# Patient Record
Sex: MALE | Race: BLACK OR AFRICAN AMERICAN | Employment: FULL TIME | URBAN - METROPOLITAN AREA
[De-identification: names, ages, dates, MRNs, and addresses within clinical notes are randomized per-mention and may not be internally consistent; named-entity substitution may affect disease eponyms.]

---

## 2018-09-18 ENCOUNTER — HOSPITAL ENCOUNTER (EMERGENCY)
Age: 68
Discharge: HOME | End: 2018-09-18
Attending: STUDENT IN AN ORGANIZED HEALTH CARE EDUCATION/TRAINING PROGRAM
Payer: MEDICARE

## 2018-09-18 VITALS
OXYGEN SATURATION: 99 % | HEIGHT: 72 IN | RESPIRATION RATE: 16 BRPM | SYSTOLIC BLOOD PRESSURE: 144 MMHG | DIASTOLIC BLOOD PRESSURE: 85 MMHG | WEIGHT: 186 LBS | BODY MASS INDEX: 25.19 KG/M2 | TEMPERATURE: 98 F | HEART RATE: 60 BPM

## 2018-09-18 DIAGNOSIS — I10 ASYMPTOMATIC HYPERTENSION: Primary | ICD-10-CM

## 2018-09-18 LAB
ATRIAL RATE: 60 BPM
CALCULATED P AXIS, ECG09: 77 DEGREES
CALCULATED R AXIS, ECG10: -2 DEGREES
CALCULATED T AXIS, ECG11: 33 DEGREES
DIAGNOSIS, 93000: NORMAL
P-R INTERVAL, ECG05: 154 MS
Q-T INTERVAL, ECG07: 416 MS
QRS DURATION, ECG06: 90 MS
QTC CALCULATION (BEZET), ECG08: 416 MS
VENTRICULAR RATE, ECG03: 60 BPM

## 2018-09-18 PROCEDURE — 93005 ELECTROCARDIOGRAM TRACING: CPT

## 2018-09-18 PROCEDURE — 99285 EMERGENCY DEPT VISIT HI MDM: CPT

## 2018-09-18 PROCEDURE — 99284 EMERGENCY DEPT VISIT MOD MDM: CPT

## 2018-09-18 RX ORDER — HYDROCHLOROTHIAZIDE 25 MG/1
12.5 TABLET ORAL DAILY
Qty: 7 TAB | Refills: 0 | Status: SHIPPED | OUTPATIENT
Start: 2018-09-18 | End: 2018-10-02

## 2018-09-18 NOTE — DISCHARGE INSTRUCTIONS
Learning About Diuretics for High Blood Pressure  Introduction  Diuretics help to lower blood pressure. This reduces your risk of a heart attack and stroke. It also reduces your risk of kidney disease. Diuretics cause your kidneys to remove sodium and water. They also relax the blood vessel walls. These help lower your blood pressure. Examples  · Chlorthalidone  · Hydrochlorothiazide  Possible side effects  There are some common side effects. They are:  · Too little potassium. · Feeling dizzy. · Rash. · Urinating a lot. · High blood sugar. (But this is not common.)  You may have other side effects. Check the information that comes with your medicine. What to know about taking this medicine  · You may take other medicines for blood pressure. Diuretics can help those work better. They can also prevent extra fluid in your body. · You may need to take potassium pills. Or you may have to watch how much potassium is in your food. Ask your doctor about this. · You may need blood tests to check your kidneys and your potassium level. · Take your medicines exactly as prescribed. Call your doctor if you think you are having a problem with your medicine. · Check with your doctor or pharmacist before you use any other medicines. This includes over-the-counter medicines. Make sure your doctor knows all of the medicines, vitamins, herbal products, and supplements you take. Taking some medicines together can cause problems. Where can you learn more? Go to http://jenny-kuldip.info/. Enter H609 in the search box to learn more about \"Learning About Diuretics for High Blood Pressure. \"  Current as of: May 10, 2017  Content Version: 11.7  © 8906-9856 [employer]. Care instructions adapted under license by [employer] (which disclaims liability or warranty for this information).  If you have questions about a medical condition or this instruction, always ask your healthcare professional. [address] any warranty or liability for your use of this information. High Blood Pressure: Care Instructions  Your Care Instructions    If your blood pressure is usually above 130/80, you have high blood pressure, or hypertension. That means the top number is 130 or higher or the bottom number is 80 or higher, or both. Despite what a lot of people think, high blood pressure usually doesn't cause headaches or make you feel dizzy or lightheaded. It usually has no symptoms. But it does increase your risk for heart attack, stroke, and kidney or eye damage. The higher your blood pressure, the more your risk increases. Your doctor will give you a goal for your blood pressure. Your goal will be based on your health and your age. Lifestyle changes, such as eating healthy and being active, are always important to help lower blood pressure. You might also take medicine to reach your blood pressure goal.  Follow-up care is a key part of your treatment and safety. Be sure to make and go to all appointments, and call your doctor if you are having problems. It's also a good idea to know your test results and keep a list of the medicines you take. How can you care for yourself at home? Medical treatment  · If you stop taking your medicine, your blood pressure will go back up. You may take one or more types of medicine to lower your blood pressure. Be safe with medicines. Take your medicine exactly as prescribed. Call your doctor if you think you are having a problem with your medicine. · Talk to your doctor before you start taking aspirin every day. Aspirin can help certain people lower their risk of a heart attack or stroke. But taking aspirin isn't right for everyone, because it can cause serious bleeding. · See your doctor regularly. You may need to see the doctor more often at first or until your blood pressure comes down.   · If you are taking blood pressure medicine, talk to your doctor before you take decongestants or anti-inflammatory medicine, such as ibuprofen. Some of these medicines can raise blood pressure. · Learn how to check your blood pressure at home. Lifestyle changes  · Stay at a healthy weight. This is especially important if you put on weight around the waist. Losing even 10 pounds can help you lower your blood pressure. · If your doctor recommends it, get more exercise. Walking is a good choice. Bit by bit, increase the amount you walk every day. Try for at least 30 minutes on most days of the week. You also may want to swim, bike, or do other activities. · Avoid or limit alcohol. Talk to your doctor about whether you can drink any alcohol. · Try to limit how much sodium you eat to less than 2,300 milligrams (mg) a day. Your doctor may ask you to try to eat less than 1,500 mg a day. · Eat plenty of fruits (such as bananas and oranges), vegetables, legumes, whole grains, and low-fat dairy products. · Lower the amount of saturated fat in your diet. Saturated fat is found in animal products such as milk, cheese, and meat. Limiting these foods may help you lose weight and also lower your risk for heart disease. · Do not smoke. Smoking increases your risk for heart attack and stroke. If you need help quitting, talk to your doctor about stop-smoking programs and medicines. These can increase your chances of quitting for good. When should you call for help? Call 911 anytime you think you may need emergency care. This may mean having symptoms that suggest that your blood pressure is causing a serious heart or blood vessel problem. Your blood pressure may be over 180/110.   For example, call 911 if:    · You have symptoms of a heart attack. These may include:  ¨ Chest pain or pressure, or a strange feeling in the chest.  ¨ Sweating. ¨ Shortness of breath. ¨ Nausea or vomiting.   ¨ Pain, pressure, or a strange feeling in the back, neck, jaw, or upper belly or in one or both shoulders or arms. ¨ Lightheadedness or sudden weakness. ¨ A fast or irregular heartbeat.     · You have symptoms of a stroke. These may include:  ¨ Sudden numbness, tingling, weakness, or loss of movement in your face, arm, or leg, especially on only one side of your body. ¨ Sudden vision changes. ¨ Sudden trouble speaking. ¨ Sudden confusion or trouble understanding simple statements. ¨ Sudden problems with walking or balance. ¨ A sudden, severe headache that is different from past headaches.     · You have severe back or belly pain.    Do not wait until your blood pressure comes down on its own. Get help right away.   Call your doctor now or seek immediate care if:    · Your blood pressure is much higher than normal (such as 180/110 or higher), but you don't have symptoms.     · You think high blood pressure is causing symptoms, such as:  ¨ Severe headache. ¨ Blurry vision.    Watch closely for changes in your health, and be sure to contact your doctor if:    · Your blood pressure measures 140/90 or higher at least 2 times. That means the top number is 140 or higher or the bottom number is 90 or higher, or both.     · You think you may be having side effects from your blood pressure medicine.     · Your blood pressure is usually normal, but it goes above normal at least 2 times. Where can you learn more? Go to http://jenny-kuldip.info/. Enter V107 in the search box to learn more about \"High Blood Pressure: Care Instructions. \"  Current as of: December 6, 2017  Content Version: 11.7  © 4200-6033 [employer]. Care instructions adapted under license by [employer] (which disclaims liability or warranty for this information). If you have questions about a medical condition or this instruction, always ask your healthcare professional. [address] any warranty or liability for your use of this information.

## 2018-09-18 NOTE — ED NOTES
Patient denies shortness of breath, chest pain, or pressure, or headache. A & O x 4. No requests. Awaiting disposition.

## 2018-09-18 NOTE — ED TRIAGE NOTES
Patient arrived from home c.o chest discomfort and hypertension. Patient denies hx hypertension or home medications. Patient has had surgical procedure to abdomin.

## 2018-09-18 NOTE — ED PROVIDER NOTES
EMERGENCY DEPARTMENT HISTORY AND PHYSICAL EXAM 
 
3:53 PM 
 
 
Date: 9/18/2018 Patient Name: Patrice Li History of Presenting Illness Chief Complaint Patient presents with  Hypertension History Provided By: Patient Chief Complaint: Hypertension Duration:  N/A Timing:  N/A Location: N/A Quality: No Pain Severity: N/A Modifying Factors: N/A Associated Symptoms: denies any other associated signs or symptoms Additional History (Context): Patrice Li is a 77 y.o. male with No significant past medical history who presents with asymptomatic hypertension. Patient reports he took routine BP at [facility] and noted it to be 180/94 with repeat readings at a similar level. He denies any associated chest pain, dyspnea, abdominal pain, nausea, arm pain, neck pain, or neurologic symptoms. He does endorse occasional central chest burning he attributes to acid reflux. PCP: None Current Outpatient Prescriptions Medication Sig Dispense Refill  hydroCHLOROthiazide (HYDRODIURIL) 25 mg tablet Take 0.5 Tabs by mouth daily for 14 days. 7 Tab 0  
 STOOL SOFTENER-LAXATIVE 8.6-50 mg per tablet   1  
 silver sulfADIAZINE (SILVADENE) 1 % topical cream Apply  to affected area daily. If blisters to the right side of abdominal incision burst. 50 g 0  
 oxyCODONE-acetaminophen (PERCOCET) 7.5-325 mg per tablet Take  by mouth every four (4) hours as needed for Pain.  oxyCODONE-acetaminophen (PERCOCET) 5-325 mg per tablet 1 or 2 tablets by mouth every 4 hours as needed 40 Tab 0 Past History Past Medical History: No past medical history on file. Past Surgical History: 
Past Surgical History:  
Procedure Laterality Date  HX HERNIA REPAIR Family History: No family history on file. Social History: 
Social History Substance Use Topics  Smoking status: Never Smoker  Smokeless tobacco: Never Used  Alcohol use Not on file Allergies: 
No Known Allergies Review of Systems Review of Systems Constitutional: Negative. Negative for diaphoresis and fatigue. Eyes: Negative for photophobia and visual disturbance. Respiratory: Negative for cough, chest tightness and shortness of breath. Cardiovascular: Negative for chest pain, palpitations and leg swelling. Gastrointestinal: Negative for abdominal pain, nausea and vomiting. Endocrine: Negative for polydipsia and polyuria. Musculoskeletal: Negative. Skin: Negative. Neurological: Negative. Physical Exam  
 
Visit Vitals  /85  Pulse 60  Temp 98 °F (36.7 °C)  Resp 16  
 Ht 6' (1.829 m)  Wt 84.4 kg (186 lb)  SpO2 99%  BMI 25.23 kg/m2 Physical Exam  
Constitutional: He is oriented to person, place, and time. He appears well-developed and well-nourished. HENT:  
Head: Normocephalic and atraumatic. Neck: Normal range of motion. Neck supple. No JVD present. Cardiovascular: Normal rate, regular rhythm, S1 normal, S2 normal, normal heart sounds and intact distal pulses. No murmur heard. Pulses: 
     Radial pulses are 2+ on the right side, and 2+ on the left side. Pulmonary/Chest: Effort normal and breath sounds normal. No respiratory distress. He exhibits no tenderness. Abdominal: Soft. He exhibits no distension. There is no tenderness. Musculoskeletal: He exhibits no edema. Neurological: He is alert and oriented to person, place, and time. Coordination normal.  
Skin: Skin is warm and dry. Psychiatric: He has a normal mood and affect. Diagnostic Study Results Labs - Recent Results (from the past 12 hour(s)) EKG, 12 LEAD, INITIAL Collection Time: 09/18/18  3:42 PM  
Result Value Ref Range Ventricular Rate 60 BPM  
 Atrial Rate 60 BPM  
 P-R Interval 154 ms QRS Duration 90 ms Q-T Interval 416 ms  
 QTC Calculation (Bezet) 416 ms Calculated P Axis 77 degrees Calculated R Axis -2 degrees Calculated T Axis 33 degrees Diagnosis Normal sinus rhythm Voltage criteria for left ventricular hypertrophy ST elevation, probably due to early repolarization Abnormal ECG When compared with ECG of 01-JUL-2015 00:21, 
premature supraventricular complexes are no longer present Vent. rate has decreased BY  61 BPM 
ST no longer depressed in Inferior leads ST elevation now present in Anterolateral leads Nonspecific T wave abnormality no longer evident in Lateral leads Radiologic Studies - No orders to display Medical Decision Making I am the first provider for this patient. I reviewed the vital signs, available nursing notes, past medical history, past surgical history, family history and social history. Vital Signs-Reviewed the patient's vital signs. Pulse Oximetry Analysis -  100 on room air (Interpretation) Cardiac Monitor: 
Rate:  
Rhythm:  Normal Sinus Rhythm EKG: Interpreted by the EP. Time Interpreted:  
 Rate:  
 Rhythm: Normal Sinus Rhythm Interpretation: 
 Comparison:  
 
Records Reviewed: Nursing Notes (Time of Review: 3:53 PM) ED Course: Progress Notes, Reevaluation, and Consults: 
 
 
Provider Notes (Medical Decision Making):  
77 YOM with no known medical history presenting with asymptomatic hypertension. No associated sxs at this time. Cardiopulmonary exam unremarkable, no focal neurologic deficits, patient alert and oriented x4. Without chest pain, dyspnea, fatigue, or other evidence of end organ damage, acute cardiovascular emergency is highly unlikely at this time. Patient's past encounters have demonstrated hypertension, his HTN is likely long-standing. EKG demonstrated no evidence of STEMI or arrythmia. Patient does not require further emergent diagnostics or intervention at this time.  Will discharge patient home with 2 weeks of HCTZ and referral for family medicine clinic follow-up for primary care and treatment of HTN. Patient counseled on long-term effects of untreated HTN and return precautions. Patient stable at this time and safe for discharge. Procedures:  
 
Core Measures:  
 
Critical Care Time:  
 
For Hospitalized Patients: 
 
1. Hospitalization Decision Time: The decision to hospitalize the patient was made by Dr. murcia  on 9/18/2018 2. Aspirin: Aspirin was not given because the patient did not present with a stroke at the time of their Emergency Department evaluation Diagnosis Clinical Impression: 1. Asymptomatic hypertension Disposition: Discharged Follow-up Information Follow up With Details Comments Contact Info Branden Platt MD Call in 1 week For primary care and follow-up [address] [facility] [address] 
[phone] Discharge Medication List as of 9/18/2018  4:11 PM  
  
START taking these medications Details  
hydroCHLOROthiazide (HYDRODIURIL) 25 mg tablet Take 0.5 Tabs by mouth daily for 14 days. , Normal, Disp-7 Tab, R-0  
  
  
CONTINUE these medications which have NOT CHANGED Details STOOL SOFTENER-LAXATIVE 8.6-50 mg per tablet Historical Med, R-1  
  
silver sulfADIAZINE (SILVADENE) 1 % topical cream Apply  to affected area daily. If blisters to the right side of abdominal incision burst., Normal, Disp-50 g, R-0  
  
oxyCODONE-acetaminophen (PERCOCET) 7.5-325 mg per tablet Take  by mouth every four (4) hours as needed for Pain., Historical Med  
  
oxyCODONE-acetaminophen (PERCOCET) 5-325 mg per tablet 1 or 2 tablets by mouth every 4 hours as needed, Print, Disp-40 Tab, R-0  
  
  
 
_______________________________ Attestations: 
Scribe Attestation Ling Anderson MD acting as a scribe for and in the presence of No att. providers found September 18, 2018 at 4:29 PM 
    
Provider Attestation:     
I personally performed the services described in the documentation, reviewed the documentation, as recorded by the scribe in my presence, and it accurately and completely records my words and actions. September 18, 2018 at 4:29 PM - No att. providers found   
_______________________________

## 2020-05-17 ENCOUNTER — HOSPITAL ENCOUNTER (EMERGENCY)
Age: 70
Discharge: HOME | End: 2020-05-17
Attending: EMERGENCY MEDICINE
Payer: MEDICARE

## 2020-05-17 VITALS
SYSTOLIC BLOOD PRESSURE: 186 MMHG | HEART RATE: 87 BPM | DIASTOLIC BLOOD PRESSURE: 88 MMHG | TEMPERATURE: 98.3 F | RESPIRATION RATE: 20 BRPM | OXYGEN SATURATION: 97 %

## 2020-05-17 DIAGNOSIS — K08.89 PAIN, DENTAL: Primary | ICD-10-CM

## 2020-05-17 PROCEDURE — 99282 EMERGENCY DEPT VISIT SF MDM: CPT

## 2020-05-17 RX ORDER — IBUPROFEN 400 MG/1
400 TABLET ORAL
Qty: 20 TAB | Refills: 0 | Status: SHIPPED | OUTPATIENT
Start: 2020-05-17

## 2020-05-17 RX ORDER — AMOXICILLIN 500 MG/1
500 TABLET, FILM COATED ORAL 3 TIMES DAILY
Qty: 30 TAB | Refills: 0 | Status: SHIPPED | OUTPATIENT
Start: 2020-05-17 | End: 2020-05-27

## 2020-05-17 RX ORDER — HYDROCODONE BITARTRATE AND ACETAMINOPHEN 5; 325 MG/1; MG/1
1 TABLET ORAL
Qty: 9 TAB | Refills: 0 | Status: SHIPPED | OUTPATIENT
Start: 2020-05-17 | End: 2020-05-20

## 2020-05-17 NOTE — ED PROVIDER NOTES
EMERGENCY DEPARTMENT HISTORY AND PHYSICAL EXAM    3:37 PM      Date: 5/17/2020  Patient Name: Ty Douglass    History of Presenting Illness     Chief Complaint   Patient presents with    Dental Pain     L bottom dental pain       History Provided By: Patient    Chief Complaint: left lower dental pain  Duration:  Days  Timing:  Acute  Location:   Quality: Aching  Severity: 9 out of 10  Modifying Factors: not improved with Tylenol  Associated Symptoms: denies any other associated signs or symptoms      Additional History (Context):Murali Jackson is a 65 y.o. male who presents to the emergency department for evaluation of left lower dental pain for the past few days. States he is unable to get in with his dentist until after 5/21/20. No improvement with Tylenol at home. He denies any recent fevers, chills, vomiting, diarrhea, difficulty breathing, difficulty swallowing, URI symptoms, CP, cough, or any other concerns. PCP:  None      Current Outpatient Medications   Medication Sig Dispense Refill    amoxicillin 500 mg tab Take 500 mg by mouth three (3) times daily for 10 days. 30 Tab 0    HYDROcodone-acetaminophen (Norco) 5-325 mg per tablet Take 1 Tab by mouth every eight (8) hours as needed for Pain for up to 3 days. Max Daily Amount: 3 Tabs. 9 Tab 0    ibuprofen (MOTRIN) 400 mg tablet Take 1 Tab by mouth every eight (8) hours as needed for Pain. 20 Tab 0    STOOL SOFTENER-LAXATIVE 8.6-50 mg per tablet   1    silver sulfADIAZINE (SILVADENE) 1 % topical cream Apply  to affected area daily. If blisters to the right side of abdominal incision burst. 50 g 0    oxyCODONE-acetaminophen (PERCOCET) 7.5-325 mg per tablet Take  by mouth every four (4) hours as needed for Pain.  oxyCODONE-acetaminophen (PERCOCET) 5-325 mg per tablet 1 or 2 tablets by mouth every 4 hours as needed 40 Tab 0       Past History     Past Medical History:  No past medical history on file.     Past Surgical History:  Past Surgical History:   Procedure Laterality Date    HX HERNIA REPAIR         Family History:  No family history on file. Social History:  Social History     Tobacco Use    Smoking status: Never Smoker    Smokeless tobacco: Never Used   Substance Use Topics    Alcohol use: Not on file    Drug use: Not on file       Allergies:  No Known Allergies      Review of Systems       Review of Systems   Constitutional: Negative for chills and fever. HENT: Positive for dental problem. Negative for congestion, rhinorrhea and sore throat. Respiratory: Negative for cough and shortness of breath. Cardiovascular: Negative for chest pain. Gastrointestinal: Negative for abdominal pain, blood in stool, constipation, diarrhea, nausea and vomiting. Genitourinary: Negative for dysuria, frequency and hematuria. Musculoskeletal: Negative for back pain and myalgias. Skin: Negative for rash and wound. Neurological: Negative for dizziness and headaches. All other systems reviewed and are negative. Physical Exam     Visit Vitals  /88 (BP 1 Location: Left arm, BP Patient Position: At rest)   Pulse 87   Temp 98.3 °F (36.8 °C)   Resp 20   SpO2 97%       Physical Exam  Vitals signs and nursing note reviewed. Constitutional:       General: He is not in acute distress. Appearance: He is well-developed. He is not diaphoretic. HENT:      Head: Normocephalic and atraumatic. Mouth/Throat:      Comments: Diffusely poor dentition. No localized induration or fluctuance to suggest drainable abscess. No sublingual/submandibular induration, trismus, or stridor. Normal speech. Handling oral secretions without difficulty. Pt with full ROM of neck. Eyes:      Conjunctiva/sclera: Conjunctivae normal.   Neck:      Musculoskeletal: Normal range of motion and neck supple. Cardiovascular:      Rate and Rhythm: Normal rate and regular rhythm. Heart sounds: Normal heart sounds.    Pulmonary:      Effort: Pulmonary effort is normal. No respiratory distress. Breath sounds: Normal breath sounds. Chest:      Chest wall: No tenderness. Musculoskeletal: Normal range of motion. General: No deformity. Skin:     General: Skin is warm and dry. Neurological:      Mental Status: He is alert and oriented to person, place, and time. Diagnostic Study Results     Labs -  No results found for this or any previous visit (from the past 12 hour(s)). Radiologic Studies -   No results found. Medical Decision Making   I am the first provider for this patient. I reviewed the vital signs, available nursing notes, past medical history, past surgical history, family history and social history. Vital Signs-Reviewed the patient's vital signs. Pulse Oximetry Analysis -  97% on room air (Interpretation)    Records Reviewed: Nursing Notes and Old Medical Records (Time of Review: 3:37 PM)    ED Course: Progress Notes, Reevaluation, and Consults:    Provider Notes (Medical Decision Making):   Differential Diagnosis:  Dentalgia, dental caries, dental fracture, periodontal abscess/cellulitis, gingivitis, facial abscess/cellulitis    Plan:  Pt presents ambulatory in NAD, well-hydrated, non-toxic in appearance, with normal vitals. Exam reveals diffusely poor dentition without localized induration or fluctuance to suggest drainable abscess. No sublingual/submandibular induration, trismus, or stridor. Normal speech. Handling oral secretions without difficulty. Pt with full ROM of neck. Low suspicion for Isai's angina or deep space infection. Will DC home with pain meds, amoxicillin. Pt is strongly advised to follow-up with dentist in short period of time as they will need definitive management. At this time, patient is stable and appropriate for discharge home. Patient demonstrates understanding of current diagnoses and is in agreement with the treatment plan.   They are advised that while the likelihood of serious underlying condition is low at this point given the evaluation performed today, we cannot fully rule it out. They are advised to immediately return with any new symptoms or worsening of current condition. All questions have been answered. Patient is given educational material regarding their diagnoses, including danger symptoms and when to return to the ED. Diagnosis     Clinical Impression:   1. Pain, dental        Disposition: DC Home    Follow-up Information     Follow up With Specialties Details Why Contact Info    Your dentist  Call For follow-up     [facility] EMERGENCY DEPT Emergency Medicine Go to As needed, If symptoms worsen [address] [place] [address] [address] [address]  [phone]           Discharge Medication List as of 5/17/2020  3:36 PM      CONTINUE these medications which have NOT CHANGED    Details   STOOL SOFTENER-LAXATIVE 8.6-50 mg per tablet Historical Med, R-1      silver sulfADIAZINE (SILVADENE) 1 % topical cream Apply  to affected area daily. If blisters to the right side of abdominal incision burst., Normal, Disp-50 g, R-0      oxyCODONE-acetaminophen (PERCOCET) 7.5-325 mg per tablet Take  by mouth every four (4) hours as needed for Pain., Historical Med      oxyCODONE-acetaminophen (PERCOCET) 5-325 mg per tablet 1 or 2 tablets by mouth every 4 hours as needed, Print, Disp-40 Tab, R-0           _______________________________    This note was dictated utilizing voice recognition software which may lead to typographical errors. I apologize in advance if the situation occurs. If questions arise please do not hesitate to contact me or call our department.   Brendan Ramirez PA-C

## 2020-05-17 NOTE — DISCHARGE INSTRUCTIONS
Please follow-up with one of the dental clinics listed below:    [facility] [phone]  [facility] [phone]  [facility] [phone] (For Extractions Only)  [address] [facility] [phone]  [facility] [phone] [address] [phone]    Call to schedule an appointment. Patient Education        Periodontal Conditions: Care Instructions  Your Care Instructions    Periodontal conditions affect the gums, bone, and tissue that surround and support the teeth. The most common problems are caused by plaque. Plaque is a thin film of bacteria that sticks to teeth above and below the gum line. It can build up and harden into tartar. The bacteria in plaque and tartar can cause gum disease. Gingivitis is a disease that affects the gums (gingiva). The gums are the soft tissue that surrounds the teeth. Gingivitis causes red, swollen, tender gums that bleed easily when brushed, persistent bad breath, and sensitive teeth. Because it is not painful, many people do not get treatment when they should. Gingivitis can be reversed with good dental care. Periodontitis is a more advanced disease that affects more than the gums. The gums pull away from the teeth. This leaves deep pockets where bacteria can grow. The disease can damage the bones that support the teeth. The teeth may get loose and fall out. A periodontal condition should be treated as soon as it is found. Finding gum problems early, treating them right away, and having regular checkups bring the best results. You can treat mild periodontal conditions by brushing and flossing your teeth every day. Your dentist may prescribe a mouthwash to kill the bacteria that can damage teeth and gums. Your dentist may have you take antibiotics to treat infection from moderate periodontal disease.   If your gums have pulled away from your teeth, you may need cleaning between the teeth and gums right down to the teeth roots. This is called root planing and scaling. If you have severe periodontal disease, you may need surgery to remove diseased gum tissue or repair bone damage. Follow-up care is a key part of your treatment and safety. Be sure to make and go to all appointments, and call your dentist if you are having problems. It's also a good idea to know your test results and keep a list of the medicines you take. How can you care for yourself at home? · If your dentist prescribed antibiotics, take them as directed. Do not stop taking them just because you feel better. You need to take the full course of antibiotics. · Brush your teeth twice a day, in the morning and at night. ? Use a toothbrush with soft, rounded-end bristles and a head that is small enough to reach all parts of your teeth and mouth. Replace your toothbrush every 3 to 4 months. ? Use a fluoride toothpaste. ? Place the brush at a 45-degree angle where the teeth meet the gums. Press firmly, and gently rock the brush back and forth using small circular movements. ? Brush chewing surfaces vigorously with short back-and-forth strokes. ? Brush your tongue from back to front. · Floss at least once a day. Choose the type and flavor that you like best.  · Have your teeth cleaned by a professional at least twice a year. · Ask your dentist about using an antibacterial mouthwash to help reduce bacteria. · Rinse your mouth with water or chew sugar-free gum after meals if you can't brush your teeth. · Do not smoke or use smokeless tobacco. Tobacco use can cause periodontal disease. When should you call for help? Call your dentist now or seek immediate medical care if:    · You have symptoms of infection, such as:  ? Increased pain, swelling, warmth, or redness. ? Red streaks leading from the area. ? Pus draining from the area.   ? A fever.    Watch closely for changes in your health, and be sure to contact your dentist if:    · You have new or worse tooth pain.     · You do not get better as expected. Where can you learn more? Go to http://jenny-kuldip.info/  Enter K920 in the search box to learn more about \"Periodontal Conditions: Care Instructions. \"  Current as of: July 28, 2019Content Version: 12.4  © 8843-7722 [employer], [employer]. Care instructions adapted under license by [employer] (which disclaims liability or warranty for this information). If you have questions about a medical condition or this instruction, always ask your healthcare professional. [address] any warranty or liability for your use of this information.

## 2022-07-28 ENCOUNTER — TRANSCRIBE ORDER (OUTPATIENT)
Dept: SCHEDULING | Age: 72
End: 2022-07-28

## 2022-07-28 DIAGNOSIS — N18.30 CHRONIC KIDNEY DISEASE, STAGE III (MODERATE) (HCC): Primary | ICD-10-CM

## 2022-08-15 ENCOUNTER — HOSPITAL ENCOUNTER (OUTPATIENT)
Dept: ULTRASOUND IMAGING | Age: 72
Discharge: HOME | End: 2022-08-15
Attending: INTERNAL MEDICINE
Payer: MEDICARE

## 2022-08-15 DIAGNOSIS — N18.30 CHRONIC KIDNEY DISEASE, STAGE III (MODERATE) (HCC): ICD-10-CM

## 2022-08-15 PROCEDURE — 76770 US EXAM ABDO BACK WALL COMP: CPT

## 2022-10-04 ENCOUNTER — HOSPITAL ENCOUNTER (OUTPATIENT)
Dept: LAB | Age: 72
Discharge: HOME | End: 2022-10-04
Payer: MEDICARE

## 2022-10-04 ENCOUNTER — TRANSCRIBE ORDER (OUTPATIENT)
Dept: REGISTRATION | Age: 72
End: 2022-10-04

## 2022-10-04 DIAGNOSIS — N18.30 CKD (CHRONIC KIDNEY DISEASE), STAGE III (HCC): ICD-10-CM

## 2022-10-04 DIAGNOSIS — N18.30 CKD (CHRONIC KIDNEY DISEASE), STAGE III (HCC): Primary | ICD-10-CM

## 2022-10-04 DIAGNOSIS — I10 HTN (HYPERTENSION): ICD-10-CM

## 2022-10-04 LAB
ALBUMIN SERPL-MCNC: 4.1 G/DL (ref 3.4–5)
ANION GAP SERPL CALC-SCNC: 8 MMOL/L (ref 3–18)
APPEARANCE UR: CLEAR
BACTERIA URNS QL MICRO: NEGATIVE /HPF
BILIRUB UR QL: NEGATIVE
BUN SERPL-MCNC: 28 MG/DL (ref 7–18)
BUN/CREAT SERPL: 13 (ref 12–20)
CALCIUM SERPL-MCNC: 10.4 MG/DL (ref 8.5–10.1)
CALCIUM SERPL-MCNC: 9.9 MG/DL (ref 8.5–10.1)
CHLORIDE SERPL-SCNC: 104 MMOL/L (ref 100–111)
CO2 SERPL-SCNC: 27 MMOL/L (ref 21–32)
COLOR UR: YELLOW
CREAT SERPL-MCNC: 2.17 MG/DL (ref 0.6–1.3)
CREAT UR-MCNC: 137 MG/DL (ref 30–125)
CRP SERPL-MCNC: 0.8 MG/DL (ref 0–0.3)
EPITH CASTS URNS QL MICRO: NORMAL /LPF (ref 0–5)
ERYTHROCYTE [DISTWIDTH] IN BLOOD BY AUTOMATED COUNT: 12.8 % (ref 11.6–14.5)
ERYTHROCYTE [SEDIMENTATION RATE] IN BLOOD: 13 MM/HR (ref 0–20)
GLUCOSE SERPL-MCNC: 82 MG/DL (ref 74–99)
GLUCOSE UR STRIP.AUTO-MCNC: NEGATIVE MG/DL
HCT VFR BLD AUTO: 39.4 % (ref 36–48)
HGB BLD-MCNC: 13.3 G/DL (ref 13–16)
HGB UR QL STRIP: NEGATIVE
KETONES UR QL STRIP.AUTO: NEGATIVE MG/DL
LEUKOCYTE ESTERASE UR QL STRIP.AUTO: NEGATIVE
MCH RBC QN AUTO: 27.1 PG (ref 24–34)
MCHC RBC AUTO-ENTMCNC: 33.8 G/DL (ref 31–37)
MCV RBC AUTO: 80.4 FL (ref 78–100)
NITRITE UR QL STRIP.AUTO: NEGATIVE
NRBC # BLD: 0 K/UL (ref 0–0.01)
NRBC BLD-RTO: 0 PER 100 WBC
PH UR STRIP: 5.5 [PH] (ref 5–8)
PHOSPHATE SERPL-MCNC: 2.9 MG/DL (ref 2.5–4.9)
PLATELET # BLD AUTO: 170 K/UL (ref 135–420)
PMV BLD AUTO: 11.7 FL (ref 9.2–11.8)
POTASSIUM SERPL-SCNC: 4.1 MMOL/L (ref 3.5–5.5)
PROT UR STRIP-MCNC: NEGATIVE MG/DL
PROT UR-MCNC: 10 MG/DL
PTH-INTACT SERPL-MCNC: 171.7 PG/ML (ref 18.4–88)
RBC # BLD AUTO: 4.9 M/UL (ref 4.35–5.65)
RBC #/AREA URNS HPF: NORMAL /HPF (ref 0–5)
SODIUM SERPL-SCNC: 139 MMOL/L (ref 136–145)
SP GR UR REFRACTOMETRY: 1.01 (ref 1–1.03)
UROBILINOGEN UR QL STRIP.AUTO: 0.2 EU/DL (ref 0.2–1)
WBC # BLD AUTO: 5.4 K/UL (ref 4.6–13.2)
WBC URNS QL MICRO: NORMAL /HPF (ref 0–4)

## 2022-10-04 PROCEDURE — 86038 ANTINUCLEAR ANTIBODIES: CPT

## 2022-10-04 PROCEDURE — 85027 COMPLETE CBC AUTOMATED: CPT

## 2022-10-04 PROCEDURE — 86140 C-REACTIVE PROTEIN: CPT

## 2022-10-04 PROCEDURE — 83970 ASSAY OF PARATHORMONE: CPT

## 2022-10-04 PROCEDURE — 84165 PROTEIN E-PHORESIS SERUM: CPT

## 2022-10-04 PROCEDURE — 80069 RENAL FUNCTION PANEL: CPT

## 2022-10-04 PROCEDURE — 82570 ASSAY OF URINE CREATININE: CPT

## 2022-10-04 PROCEDURE — 86160 COMPLEMENT ANTIGEN: CPT

## 2022-10-04 PROCEDURE — 84156 ASSAY OF PROTEIN URINE: CPT

## 2022-10-04 PROCEDURE — 83521 IG LIGHT CHAINS FREE EACH: CPT

## 2022-10-04 PROCEDURE — 36415 COLL VENOUS BLD VENIPUNCTURE: CPT

## 2022-10-04 PROCEDURE — 85652 RBC SED RATE AUTOMATED: CPT

## 2022-10-04 PROCEDURE — 81001 URINALYSIS AUTO W/SCOPE: CPT

## 2022-10-05 LAB
ANA TITR SER IF: NEGATIVE {TITER}
C3 SERPL-MCNC: 149 MG/DL (ref 82–167)
C4 SERPL-MCNC: 36 MG/DL (ref 12–38)
KAPPA LC FREE SER-MCNC: 39.6 MG/L (ref 3.3–19.4)
KAPPA LC FREE/LAMBDA FREE SER: 1.86 {RATIO} (ref 0.26–1.65)
LAMBDA LC FREE SERPL-MCNC: 21.3 MG/L (ref 5.7–26.3)
PLEASE NOTE, 734348: NORMAL

## 2022-10-07 LAB
ALBUMIN SERPL ELPH-MCNC: 4.2 G/DL (ref 2.9–4.4)
ALBUMIN/GLOB SERPL: 1.3 {RATIO} (ref 0.7–1.7)
ALPHA1 GLOB SERPL ELPH-MCNC: 0.2 G/DL (ref 0–0.4)
ALPHA2 GLOB SERPL ELPH-MCNC: 0.7 G/DL (ref 0.4–1)
B-GLOBULIN SERPL ELPH-MCNC: 1.2 G/DL (ref 0.7–1.3)
GAMMA GLOB SERPL ELPH-MCNC: 1.3 G/DL (ref 0.4–1.8)
GLOBULIN SER CALC-MCNC: 3.3 G/DL (ref 2.2–3.9)
M PROTEIN SERPL ELPH-MCNC: NORMAL G/DL
PROT SERPL-MCNC: 7.5 G/DL (ref 6–8.5)

## 2022-12-22 ENCOUNTER — HOSPITAL ENCOUNTER (OUTPATIENT)
Dept: LAB | Age: 72
Discharge: HOME | End: 2022-12-22
Payer: MEDICARE

## 2022-12-22 ENCOUNTER — TRANSCRIBE ORDER (OUTPATIENT)
Dept: REGISTRATION | Age: 72
End: 2022-12-22

## 2022-12-22 DIAGNOSIS — I10 HTN (HYPERTENSION): Primary | ICD-10-CM

## 2022-12-22 DIAGNOSIS — N18.30 CKD (CHRONIC KIDNEY DISEASE), STAGE III (HCC): ICD-10-CM

## 2022-12-22 DIAGNOSIS — I10 HTN (HYPERTENSION): ICD-10-CM

## 2022-12-22 LAB
ALBUMIN SERPL-MCNC: 3.8 G/DL (ref 3.4–5)
ANION GAP SERPL CALC-SCNC: 6 MMOL/L (ref 3–18)
BUN SERPL-MCNC: 23 MG/DL (ref 7–18)
BUN/CREAT SERPL: 11 (ref 12–20)
CALCIUM SERPL-MCNC: 10.1 MG/DL (ref 8.5–10.1)
CALCIUM SERPL-MCNC: 10.1 MG/DL (ref 8.5–10.1)
CHLORIDE SERPL-SCNC: 106 MMOL/L (ref 100–111)
CO2 SERPL-SCNC: 30 MMOL/L (ref 21–32)
CREAT SERPL-MCNC: 2.07 MG/DL (ref 0.6–1.3)
CREAT UR-MCNC: 118 MG/DL (ref 30–125)
ERYTHROCYTE [DISTWIDTH] IN BLOOD BY AUTOMATED COUNT: 12.8 % (ref 11.6–14.5)
GLUCOSE SERPL-MCNC: 92 MG/DL (ref 74–99)
HCT VFR BLD AUTO: 35.9 % (ref 36–48)
HGB BLD-MCNC: 11.8 G/DL (ref 13–16)
MCH RBC QN AUTO: 27.1 PG (ref 24–34)
MCHC RBC AUTO-ENTMCNC: 32.9 G/DL (ref 31–37)
MCV RBC AUTO: 82.3 FL (ref 78–100)
NRBC # BLD: 0 K/UL (ref 0–0.01)
NRBC BLD-RTO: 0 PER 100 WBC
PHOSPHATE SERPL-MCNC: 2.9 MG/DL (ref 2.5–4.9)
PLATELET # BLD AUTO: 262 K/UL (ref 135–420)
PMV BLD AUTO: 10.9 FL (ref 9.2–11.8)
POTASSIUM SERPL-SCNC: 4 MMOL/L (ref 3.5–5.5)
PROT UR-MCNC: 10 MG/DL
PTH-INTACT SERPL-MCNC: 132.5 PG/ML (ref 18.4–88)
RBC # BLD AUTO: 4.36 M/UL (ref 4.35–5.65)
SODIUM SERPL-SCNC: 142 MMOL/L (ref 136–145)
WBC # BLD AUTO: 4.9 K/UL (ref 4.6–13.2)

## 2022-12-22 PROCEDURE — 36415 COLL VENOUS BLD VENIPUNCTURE: CPT

## 2022-12-22 PROCEDURE — 84156 ASSAY OF PROTEIN URINE: CPT

## 2022-12-22 PROCEDURE — 83970 ASSAY OF PARATHORMONE: CPT

## 2022-12-22 PROCEDURE — 85027 COMPLETE CBC AUTOMATED: CPT

## 2022-12-22 PROCEDURE — 80069 RENAL FUNCTION PANEL: CPT

## 2022-12-22 PROCEDURE — 82570 ASSAY OF URINE CREATININE: CPT

## 2023-01-22 ENCOUNTER — DOCUMENTATION ONLY (OUTPATIENT)
Dept: NEPHROLOGY | Age: 73
End: 2023-01-22

## 2023-01-23 NOTE — PROGRESS NOTES
Vignesh Zurita  Appointment: 2023 2:45 PM  Location: [address] [place] [address] Office  Patient #: [id number]  : 1950  Undefined / Language: [place] / Race: Black or   Male      History of Present Illness Liliane Tan MD; 2023 10:27 PM)  The patient  is a 64-year-old male who f/u for management of CKD 3b    Past medical history:    #1 essential hypertension for more than 10 years, uncontrolled, 3 agents lisinopril hydrochlorothiazide and Norvasc  #2 dyslipidemia  #3 bertha hydro  #4 BPH    Initial presentation:  [facility] and was noted to have elevated creatinine in the 1.5 range few months back. He was asked to push fluids and repeat labs in 2 to 3 months repeat labs showed a creatinine of 1.9. Patient denies any symptoms. No urinary symptoms. Denies extremity edema shortness of breath. His electrolytes show sodium slightly high at 146. Rest of the electrolytes in good range. Albumin is 4.5. LFTs in good range. Blood pressure in [facility] was 339 systolic. It was 130s here in my office. Patient does not check blood pressures at home. Denies any NSAID use. Denies any significant family history of kidney problems. Denies any smoking or drug abuse    Interval history:    no symptoms  BP stable  creat slightly higher 2.07-> stable  renal USG showed bertha hydro ---> seen by urology , hydro d/t BPH and GREGORY, patient  doing in out cath BID , now no nocturia  egfr 33  pth 171 --> 132  no sig proteinuria    Problem List/Past Medical (Nael Ashton; 2023 2:22 PM)  BPH (benign prostatic hyperplasia) (N40.0)    Overweight (BMI 25.0 to 29.9) (E66.3)    CKD (chronic kidney disease), stage III (N18.30)    HTN (hypertension) (I10)    Bilateral hydronephrosis (N13.30)    HLD (hyperlipidemia) (E78.5)    Problems Reconciled      Allergies (Paulette Rice; 2023 2:22 PM)  No Known Drug Allergies   [2022]:   Allergies Reconciled      Social History Keenan Long Reinaldo; 1/19/2023 2:22 PM)  Tobacco use   Never smoker. Non Drinker/No Alcohol Use    No Drug Use      Medication History (Herminio Imanhiram; 1/19/2023 2:22 PM)  tamsulosin  (0.4MG capsule, 1 (one) Oral Daily, Taken starting 10/13/2022) Active. amLODIPine Besylate  (5MG tablet, 1 Oral Daily) Active. (NORVASC)  Lisinopril-hydroCHLOROthiazide  (20-12.5MG tablet, 1 Oral Daily) Active. Medications Reconciled     Health Maintenance History (Herminio Louise; 1/19/2023 2:22 PM)  Flu Vaccine   [10/12/2022]:  Pneumovax   Refused. Colonoscopy, Screening   [09/2022]:  covid19 vaccined dates (moderna) 3/22/2021, 4/19/2021 & 11/30/2021 4th - 05/2022    Other Problems Margaux Najera; 1/19/2023 2:22 PM)  Portal Access Education (Z71.9)          Review of Systems Ace Hutton MD; 1/22/2023 10:22 PM)  General Not Present- Anorexia, Chills, Fatigue and Fever. Skin Not Present- Bruising, Pruritus, Rash and Ulcer. HEENT Not Present- Dry Mucous Membranes, Dysgeusia, Oral Ulcers, Periorbital Puffiness and Sore Throat. Respiratory Not Present- Cough, Difficulty Breathing on Exertion, Dyspnea and Hemoptysis. Cardiovascular Not Present- Chest Pain, Claudications, Orthopnea, Palpitations, Paroxysmal Nocturnal Dyspnea and Swelling of Extremities. Gastrointestinal Not Present- Abdominal Pain, Abdominal Swelling, Constipation, Diarrhea, Hematochezia, Melena, Nausea and Vomiting. Male Genitourinary Not Present- Change in Urinary Stream, Dysuria, Frequency, Hematuria, Hesitancy, Nocturia and Urgency. Musculoskeletal Not Present- Joint Pain, Joint Redness, Joint Stiffness, Joint Swelling, Leg Cramps and Myalgia. Neurological Not Present- Dizziness, Headaches, Syncope and Trouble walking. Endocrine Not Present- Appetite Changes, Excessive Thirst, Polydipsia and Polyuria. Hematology Not Present- Abnormal Bleeding and Easy Bruising.     Vitals (Herminio Louise; 1/19/2023 2:21 PM)  1/19/2023 2:20 PM  Weight: 196 lb   Height: 72 in   Height was reported by patient. Body Surface Area: 2.11 m²   Body Mass Index: 26.58 kg/m²    Pain Level: 0/10    Temp.: 97.5° F    Pulse: 76 (Regular)    Resp.: 12 (Unlabored)    P. OX: 98% (Room air)  BP: 128/70(Sitting, Left Arm, Standard)              Physical Exam Saniya Kothari MD; 1/22/2023 10:23 PM)  Chest and Lung Exam  Auscultation  Breath sounds - Clear. Cardiovascular  Auscultation  Rhythm - Regular. Heart Sounds - Normal heart sounds. Abdomen  Palpation/Percussion  Palpation and Percussion of the abdomen reveal - Soft, Non Tender and No hepatosplenomegaly. Auscultation  Auscultation of the abdomen reveals - Bowel sounds normal.    Peripheral Vascular  Upper Extremity  Palpation - Edema - Left - No edema - Left. Edema - Right - No edema - Right. Assessment & Plan Saniya Kothari MD; 1/22/2023 10:30 PM)    CKD (chronic kidney disease), stage III (N18.30) <[id number]>  Impression: . Zarate Hidden #1 GHM0V, etiology: hypertension nephrosclerosis.  does have bertha hydro d/t BPH now doing BID in and out cath , following with urology , creat 2.07 , efgfr 33 stable , no proteinuria  #2 bertha hydro d/t BPH , flomax and in oput cath BID , follow with urology  #2 essential hypertension for more than 10 years, uncontrolled, 3 agents lisinopril hydrochlorothiazide and Norvasc  #3 dyslipidemia  #4 BPH    Plan:  #1 monitor blood pressure at home daily, goal less than 130/80  #2 f/u with urology, maintain good hydration  #3 no NSAIDs  #4 continue flomax    Follow-up in 4-6 months time with CKD labs    Current Plans  PTH INTACT (26215)  RENAL FUNCTION PANEL (55746)  SPOT PROTEIN URINE (42609)  SPOT URINE CREATININE(94878)    HTN (hypertension) (I10)    Current Plans  CBC (35604)  Started amLODIPine 5 mg oral tablet, 1 tablet daily, #90, 90 days starting 01/19/2023, Ref. x3.  Local Order:  Pharmacist Notes: Luzma Mcdonald  Started lisinopriL-hydrochlorothiazide 20-12.5 mg oral tablet, 1 tablet daily, #90, 90 days starting 01/19/2023, Ref. x3.    Bilateral hydronephrosis (N13.30)      BPH (benign prostatic hyperplasia) (N40.0)    Current Plans  Continued tamsulosin 0.4 mg oral capsule, 1 (one) Capsule daily, #90, 90 days starting 01/19/2023, Ref. x3.    Portal Access Education (Z71.9)    Current Plans  Pt Education - How to [address] using Patient Portal and 3rd Party Apps: discussed with patient and provided information.     Overweight (BMI 25.0 to 29.9) (E66.3)    Current Plans  LIFESTYLE EDUCATION REGARDING DIET (46152)  Pt Education - Healthy Diet: Brief Version *: healthy diet  Immunization Record (Michael Roman; 1/19/2023 2:23 PM)      Immunization Type  Immunization Order  Date  Medication  Funding  Comment   COVID-19, mRNA, LNP-S, PF, 100 mcg or 50 mcg dose #1   COVID-Moderna (100 MCG/0.5 ML) (46852)   3/22/2021            COVID-19, mRNA, LNP-S, PF, 100 mcg or 50 mcg dose #1   COVID-Moderna (100 MCG/0.5 ML) (57403)   4/19/2021            COVID-19, mRNA, LNP-S, PF, 100 mcg or 50 mcg dose #1   COVID-Moderna (100 MCG/0.5 ML) (59608)   11/30/2021            COVID-19, mRNA, LNP-S, PF, 100 mcg or 50 mcg dose #1   COVID-Moderna (100 MCG/0.5 ML) (32146)   5/2022            COVID-19, mRNA, LNP-S, PF, 100 mcg or 50 mcg dose #2   COVID-Moderna (100 MCG/0.5 ML) (47923)   10/12/2022            Influenza (3 years and up)   Influenza (3 years and up) (77569)   10/2021            Influenza (3 years and up)   Influenza (3 years and up) (16019)   10/12/2022            Pneumococcal (2 years and up)   Pneumococcal (2 yrs and up) PPSV23 (98129)   7/5/2022         ** Not Given ** Declined by Patient/Guardian:  Signed by Kristie Peña MD (1/22/2023 10:32 PM)

## 2023-08-30 ENCOUNTER — HOSPITAL ENCOUNTER (OUTPATIENT)
Facility: HOSPITAL | Age: 73
Discharge: HOME | End: 2023-09-02
Payer: MEDICARE

## 2023-08-30 DIAGNOSIS — I10 ESSENTIAL HYPERTENSION, MALIGNANT: ICD-10-CM

## 2023-08-30 DIAGNOSIS — I12.9 MALIGNANT HYPERTENSION WITH CHRONIC RENAL DISEASE STAGE III (HCC): ICD-10-CM

## 2023-08-30 DIAGNOSIS — N18.30 MALIGNANT HYPERTENSION WITH CHRONIC RENAL DISEASE STAGE III (HCC): ICD-10-CM

## 2023-08-30 LAB
ALBUMIN SERPL-MCNC: 3.9 G/DL (ref 3.4–5)
ANION GAP SERPL CALC-SCNC: 7 MMOL/L (ref 3–18)
BUN SERPL-MCNC: 19 MG/DL (ref 7–18)
BUN/CREAT SERPL: 11 (ref 12–20)
CALCIUM SERPL-MCNC: 10.3 MG/DL (ref 8.5–10.1)
CALCIUM SERPL-MCNC: 10.7 MG/DL (ref 8.5–10.1)
CHLORIDE SERPL-SCNC: 105 MMOL/L (ref 100–111)
CO2 SERPL-SCNC: 26 MMOL/L (ref 21–32)
CREAT SERPL-MCNC: 1.73 MG/DL (ref 0.6–1.3)
CREAT UR-MCNC: 67 MG/DL (ref 30–125)
ERYTHROCYTE [DISTWIDTH] IN BLOOD BY AUTOMATED COUNT: 14.1 % (ref 11.6–14.5)
GLUCOSE SERPL-MCNC: 101 MG/DL (ref 74–99)
HCT VFR BLD AUTO: 40 % (ref 36–48)
HGB BLD-MCNC: 13.2 G/DL (ref 13–16)
MCH RBC QN AUTO: 26.9 PG (ref 24–34)
MCHC RBC AUTO-ENTMCNC: 33 G/DL (ref 31–37)
MCV RBC AUTO: 81.6 FL (ref 78–100)
NRBC # BLD: 0 K/UL (ref 0–0.01)
NRBC BLD-RTO: 0 PER 100 WBC
PHOSPHATE SERPL-MCNC: 2.6 MG/DL (ref 2.5–4.9)
PLATELET # BLD AUTO: 174 K/UL (ref 135–420)
PMV BLD AUTO: 11.2 FL (ref 9.2–11.8)
POTASSIUM SERPL-SCNC: 4.2 MMOL/L (ref 3.5–5.5)
PROT UR-MCNC: 29 MG/DL
PTH-INTACT SERPL-MCNC: 84 PG/ML (ref 18.4–88)
RBC # BLD AUTO: 4.9 M/UL (ref 4.35–5.65)
SODIUM SERPL-SCNC: 138 MMOL/L (ref 136–145)
WBC # BLD AUTO: 4.7 K/UL (ref 4.6–13.2)

## 2023-08-30 PROCEDURE — 82570 ASSAY OF URINE CREATININE: CPT

## 2023-08-30 PROCEDURE — 36415 COLL VENOUS BLD VENIPUNCTURE: CPT

## 2023-08-30 PROCEDURE — 83970 ASSAY OF PARATHORMONE: CPT

## 2023-08-30 PROCEDURE — 84156 ASSAY OF PROTEIN URINE: CPT

## 2023-08-30 PROCEDURE — 85027 COMPLETE CBC AUTOMATED: CPT

## 2023-08-30 PROCEDURE — 80069 RENAL FUNCTION PANEL: CPT

## 2023-09-07 ENCOUNTER — CLINICAL DOCUMENTATION (OUTPATIENT)
Facility: HOSPITAL | Age: 73
End: 2023-09-07

## 2023-09-11 ENCOUNTER — HOSPITAL ENCOUNTER (OUTPATIENT)
Facility: HOSPITAL | Age: 73
Discharge: HOME | End: 2023-09-14
Payer: MEDICARE

## 2023-09-11 ENCOUNTER — TRANSCRIBE ORDERS (OUTPATIENT)
Facility: HOSPITAL | Age: 73
End: 2023-09-11

## 2023-09-11 DIAGNOSIS — E83.52 HYPERCALCEMIA: ICD-10-CM

## 2023-09-11 DIAGNOSIS — E83.52 HYPERCALCEMIA: Primary | ICD-10-CM

## 2023-09-11 LAB — CA-I SERPL-SCNC: 1.39 MMOL/L (ref 1.15–1.33)

## 2023-09-11 PROCEDURE — 82330 ASSAY OF CALCIUM: CPT

## 2023-09-11 PROCEDURE — 36415 COLL VENOUS BLD VENIPUNCTURE: CPT

## 2024-03-04 ENCOUNTER — HOSPITAL ENCOUNTER (OUTPATIENT)
Facility: HOSPITAL | Age: 74
Discharge: HOME | End: 2024-03-07
Payer: MEDICARE

## 2024-03-04 DIAGNOSIS — E83.52 HYPERCALCEMIA: ICD-10-CM

## 2024-03-04 DIAGNOSIS — I10 ESSENTIAL HYPERTENSION, MALIGNANT: ICD-10-CM

## 2024-03-04 DIAGNOSIS — N18.30 STAGE 3 CHRONIC KIDNEY DISEASE, UNSPECIFIED WHETHER STAGE 3A OR 3B CKD (HCC): ICD-10-CM

## 2024-03-04 LAB
ALBUMIN SERPL-MCNC: 3.8 G/DL (ref 3.4–5)
ANION GAP SERPL CALC-SCNC: 4 MMOL/L (ref 3–18)
BUN SERPL-MCNC: 29 MG/DL (ref 7–18)
BUN/CREAT SERPL: 15 (ref 12–20)
CA-I SERPL-SCNC: 1.14 MMOL/L (ref 1.15–1.33)
CALCIUM SERPL-MCNC: 10.8 MG/DL (ref 8.5–10.1)
CALCIUM SERPL-MCNC: 10.9 MG/DL (ref 8.5–10.1)
CHLORIDE SERPL-SCNC: 108 MMOL/L (ref 100–111)
CO2 SERPL-SCNC: 27 MMOL/L (ref 21–32)
CREAT SERPL-MCNC: 1.97 MG/DL (ref 0.6–1.3)
CREAT UR-MCNC: 85 MG/DL (ref 30–125)
CREAT UR-MCNC: 86 MG/DL (ref 30–125)
ERYTHROCYTE [DISTWIDTH] IN BLOOD BY AUTOMATED COUNT: 13.9 % (ref 11.6–14.5)
GLUCOSE SERPL-MCNC: 96 MG/DL (ref 74–99)
HCT VFR BLD AUTO: 39 % (ref 36–48)
HGB BLD-MCNC: 13 G/DL (ref 13–16)
MCH RBC QN AUTO: 26.8 PG (ref 24–34)
MCHC RBC AUTO-ENTMCNC: 33.3 G/DL (ref 31–37)
MCV RBC AUTO: 80.4 FL (ref 78–100)
MICROALBUMIN UR-MCNC: 11.7 MG/DL (ref 0–3)
MICROALBUMIN/CREAT UR-RTO: 138 MG/G (ref 0–30)
NRBC # BLD: 0 K/UL (ref 0–0.01)
NRBC BLD-RTO: 0 PER 100 WBC
PHOSPHATE SERPL-MCNC: 2.2 MG/DL (ref 2.5–4.9)
PLATELET # BLD AUTO: 200 K/UL (ref 135–420)
PMV BLD AUTO: 10.5 FL (ref 9.2–11.8)
POTASSIUM SERPL-SCNC: 4.5 MMOL/L (ref 3.5–5.5)
PROT UR-MCNC: 28 MG/DL
PTH-INTACT SERPL-MCNC: 109.9 PG/ML (ref 18.4–88)
RBC # BLD AUTO: 4.85 M/UL (ref 4.35–5.65)
SODIUM SERPL-SCNC: 139 MMOL/L (ref 136–145)
WBC # BLD AUTO: 5 K/UL (ref 4.6–13.2)

## 2024-03-04 PROCEDURE — 83970 ASSAY OF PARATHORMONE: CPT

## 2024-03-04 PROCEDURE — 84156 ASSAY OF PROTEIN URINE: CPT

## 2024-03-04 PROCEDURE — 85027 COMPLETE CBC AUTOMATED: CPT

## 2024-03-04 PROCEDURE — 82570 ASSAY OF URINE CREATININE: CPT

## 2024-03-04 PROCEDURE — 82043 UR ALBUMIN QUANTITATIVE: CPT

## 2024-03-04 PROCEDURE — 36415 COLL VENOUS BLD VENIPUNCTURE: CPT

## 2024-03-04 PROCEDURE — 80069 RENAL FUNCTION PANEL: CPT

## 2024-03-04 PROCEDURE — 82330 ASSAY OF CALCIUM: CPT

## 2024-10-28 ENCOUNTER — CARE COORDINATION (OUTPATIENT)
Dept: OTHER | Facility: CLINIC | Age: 74
End: 2024-10-28

## 2024-10-28 NOTE — CARE COORDINATION
Ambulatory Care Coordination Note     10/28/2024 9:16 AM     Patient outreach attempt by this ACM today to offer care management services. ACM was unable to reach the patient by telephone today; voicemail full and unable to leave a message.      ACM: Susan Forman RN     Care Summary Note: Unable to reach patient at this time.      PCP/Specialist follow up:       Follow Up:   Plan for next ACM outreach in approximately 1-2 days  to complete:  - outreach attempt to offer care management services.        Susan Forman RN BSN  [phone]

## 2024-10-29 ENCOUNTER — CARE COORDINATION (OUTPATIENT)
Dept: OTHER | Facility: CLINIC | Age: 74
End: 2024-10-29

## 2024-10-29 NOTE — CARE COORDINATION
Ambulatory Care Coordination Note     10/29/2024 10:40 AM     Patient outreach attempt by this ACM today to offer care management services. ACM was unable to reach the patient by telephone today; voicemail full and unable to leave a message.      ACM: Susan Forman RN     Care Summary Note: Unable to reach patient at this time.        Follow Up:   Plan for next ACM outreach in approximately 1 week to complete:  - outreach attempt to offer care management services.        Susan Forman RN BSN  [phone]

## 2024-11-04 ENCOUNTER — CARE COORDINATION (OUTPATIENT)
Dept: OTHER | Facility: CLINIC | Age: 74
End: 2024-11-04

## 2024-11-04 NOTE — CARE COORDINATION
Ambulatory Care Coordination Note     11/4/2024 1:22 PM     patient outreach attempt by this ACM today to offer care management services. ACM was unable to reach the patient by telephone today; voicemail full and unable to leave a message.      Patient closed (unable to reach patient) from the High Risk Care Management program on 11/4/2024.  No further Ambulatory Care Manager follow up scheduled.     Susan Forman RN BSN  [phone]

## 2025-05-19 ENCOUNTER — HOSPITAL ENCOUNTER (OUTPATIENT)
Facility: HOSPITAL | Age: 75
Setting detail: SPECIMEN
Discharge: HOME | End: 2025-05-22

## 2025-05-19 LAB — LABCORP SPECIMEN COLLECTION: NORMAL

## 2025-05-19 PROCEDURE — 99001 SPECIMEN HANDLING PT-LAB: CPT
